# Patient Record
Sex: FEMALE | Race: WHITE | Employment: FULL TIME | URBAN - METROPOLITAN AREA
[De-identification: names, ages, dates, MRNs, and addresses within clinical notes are randomized per-mention and may not be internally consistent; named-entity substitution may affect disease eponyms.]

---

## 2021-04-09 LAB
CHLAMYDIA, EXTERNAL: NEGATIVE
HBSAG, EXTERNAL: NEGATIVE
HIV, EXTERNAL: NEGATIVE
N. GONORRHEA, EXTERNAL: NEGATIVE
RUBELLA, EXTERNAL: NORMAL
T. PALLIDUM, EXTERNAL: NEGATIVE
TYPE, ABO & RH, EXTERNAL: NORMAL

## 2021-12-02 LAB — GRBS, EXTERNAL: NEGATIVE

## 2021-12-28 ENCOUNTER — HOSPITAL ENCOUNTER (INPATIENT)
Age: 26
LOS: 3 days | Discharge: HOME | End: 2021-12-31
Attending: OBSTETRICS & GYNECOLOGY | Admitting: OBSTETRICS & GYNECOLOGY
Payer: COMMERCIAL

## 2021-12-28 ENCOUNTER — ANESTHESIA (OUTPATIENT)
Dept: LABOR AND DELIVERY | Age: 26
End: 2021-12-28
Payer: COMMERCIAL

## 2021-12-28 ENCOUNTER — ANESTHESIA EVENT (OUTPATIENT)
Dept: LABOR AND DELIVERY | Age: 26
End: 2021-12-28
Payer: COMMERCIAL

## 2021-12-28 DIAGNOSIS — Z98.891 STATUS POST CESAREAN DELIVERY: Primary | ICD-10-CM

## 2021-12-28 PROBLEM — O42.90 AMNIOTIC FLUID LEAKING: Status: ACTIVE | Noted: 2021-12-28

## 2021-12-28 LAB
COVID-19 RAPID TEST, COVR: NOT DETECTED
ERYTHROCYTE [DISTWIDTH] IN BLOOD BY AUTOMATED COUNT: 13.1 % (ref 11.5–14.5)
HCT VFR BLD AUTO: 41.3 % (ref 35–47)
HGB BLD-MCNC: 14 G/DL (ref 11.5–16)
MCH RBC QN AUTO: 32.3 PG (ref 26–34)
MCHC RBC AUTO-ENTMCNC: 33.9 G/DL (ref 30–36.5)
MCV RBC AUTO: 95.4 FL (ref 80–99)
NRBC # BLD: 0 K/UL (ref 0–0.01)
NRBC BLD-RTO: 0 PER 100 WBC
PLATELET # BLD AUTO: 192 K/UL (ref 150–400)
PMV BLD AUTO: 11.4 FL (ref 8.9–12.9)
RBC # BLD AUTO: 4.33 M/UL (ref 3.8–5.2)
SARS-COV-2, COV2: NORMAL
SOURCE, COVRS: NORMAL
WBC # BLD AUTO: 9.7 K/UL (ref 3.6–11)

## 2021-12-28 PROCEDURE — 74011000250 HC RX REV CODE- 250: Performed by: ANESTHESIOLOGY

## 2021-12-28 PROCEDURE — 00HU33Z INSERTION OF INFUSION DEVICE INTO SPINAL CANAL, PERCUTANEOUS APPROACH: ICD-10-PCS | Performed by: ANESTHESIOLOGY

## 2021-12-28 PROCEDURE — 85027 COMPLETE CBC AUTOMATED: CPT

## 2021-12-28 PROCEDURE — 74011000258 HC RX REV CODE- 258: Performed by: MIDWIFE

## 2021-12-28 PROCEDURE — 74011250636 HC RX REV CODE- 250/636: Performed by: MIDWIFE

## 2021-12-28 PROCEDURE — 36415 COLL VENOUS BLD VENIPUNCTURE: CPT

## 2021-12-28 PROCEDURE — 3E0R3BZ INTRODUCTION OF ANESTHETIC AGENT INTO SPINAL CANAL, PERCUTANEOUS APPROACH: ICD-10-PCS | Performed by: ANESTHESIOLOGY

## 2021-12-28 PROCEDURE — 99285 EMERGENCY DEPT VISIT HI MDM: CPT

## 2021-12-28 PROCEDURE — 65270000029 HC RM PRIVATE

## 2021-12-28 PROCEDURE — 74011250636 HC RX REV CODE- 250/636: Performed by: STUDENT IN AN ORGANIZED HEALTH CARE EDUCATION/TRAINING PROGRAM

## 2021-12-28 PROCEDURE — 87635 SARS-COV-2 COVID-19 AMP PRB: CPT

## 2021-12-28 RX ORDER — OXYTOCIN/RINGER'S LACTATE 30/500 ML
1-25 PLASTIC BAG, INJECTION (ML) INTRAVENOUS
Status: DISCONTINUED | OUTPATIENT
Start: 2021-12-28 | End: 2021-12-31 | Stop reason: HOSPADM

## 2021-12-28 RX ORDER — TERBUTALINE SULFATE 1 MG/ML
0.25 INJECTION SUBCUTANEOUS AS NEEDED
Status: DISCONTINUED | OUTPATIENT
Start: 2021-12-28 | End: 2021-12-29 | Stop reason: HOSPADM

## 2021-12-28 RX ORDER — ONDANSETRON 2 MG/ML
4 INJECTION INTRAMUSCULAR; INTRAVENOUS
Status: DISCONTINUED | OUTPATIENT
Start: 2021-12-28 | End: 2021-12-29 | Stop reason: HOSPADM

## 2021-12-28 RX ORDER — BUTORPHANOL TARTRATE 1 MG/ML
2 INJECTION INTRAMUSCULAR; INTRAVENOUS
Status: DISCONTINUED | OUTPATIENT
Start: 2021-12-28 | End: 2021-12-29 | Stop reason: HOSPADM

## 2021-12-28 RX ORDER — BUTORPHANOL TARTRATE 1 MG/ML
1 INJECTION INTRAMUSCULAR; INTRAVENOUS
Status: DISCONTINUED | OUTPATIENT
Start: 2021-12-28 | End: 2021-12-28

## 2021-12-28 RX ORDER — FENTANYL CITRATE 50 UG/ML
100 INJECTION, SOLUTION INTRAMUSCULAR; INTRAVENOUS
Status: DISCONTINUED | OUTPATIENT
Start: 2021-12-28 | End: 2021-12-29 | Stop reason: HOSPADM

## 2021-12-28 RX ORDER — OXYTOCIN/RINGER'S LACTATE 30/500 ML
10 PLASTIC BAG, INJECTION (ML) INTRAVENOUS AS NEEDED
Status: DISCONTINUED | OUTPATIENT
Start: 2021-12-28 | End: 2021-12-31 | Stop reason: HOSPADM

## 2021-12-28 RX ORDER — OXYTOCIN/RINGER'S LACTATE 30/500 ML
87.3 PLASTIC BAG, INJECTION (ML) INTRAVENOUS AS NEEDED
Status: COMPLETED | OUTPATIENT
Start: 2021-12-28 | End: 2021-12-29

## 2021-12-28 RX ORDER — BUPIVACAINE HYDROCHLORIDE 5 MG/ML
INJECTION, SOLUTION EPIDURAL; INTRACAUDAL
Status: COMPLETED | OUTPATIENT
Start: 2021-12-28 | End: 2021-12-28

## 2021-12-28 RX ORDER — EPHEDRINE SULFATE/0.9% NACL/PF 50 MG/5 ML
12.5 SYRINGE (ML) INTRAVENOUS
Status: COMPLETED | OUTPATIENT
Start: 2021-12-28 | End: 2021-12-29

## 2021-12-28 RX ORDER — NALOXONE HYDROCHLORIDE 0.4 MG/ML
0.4 INJECTION, SOLUTION INTRAMUSCULAR; INTRAVENOUS; SUBCUTANEOUS AS NEEDED
Status: DISCONTINUED | OUTPATIENT
Start: 2021-12-28 | End: 2021-12-29 | Stop reason: HOSPADM

## 2021-12-28 RX ORDER — SODIUM CHLORIDE, SODIUM LACTATE, POTASSIUM CHLORIDE, CALCIUM CHLORIDE 600; 310; 30; 20 MG/100ML; MG/100ML; MG/100ML; MG/100ML
125 INJECTION, SOLUTION INTRAVENOUS CONTINUOUS
Status: DISCONTINUED | OUTPATIENT
Start: 2021-12-28 | End: 2021-12-31 | Stop reason: HOSPADM

## 2021-12-28 RX ORDER — BUPIVACAINE HYDROCHLORIDE 5 MG/ML
30 INJECTION, SOLUTION EPIDURAL; INTRACAUDAL AS NEEDED
Status: DISCONTINUED | OUTPATIENT
Start: 2021-12-28 | End: 2021-12-29 | Stop reason: HOSPADM

## 2021-12-28 RX ORDER — FENTANYL/BUPIVACAINE/NS/PF 2-1250MCG
1-16 PREFILLED PUMP RESERVOIR EPIDURAL CONTINUOUS
Status: DISCONTINUED | OUTPATIENT
Start: 2021-12-28 | End: 2021-12-29 | Stop reason: HOSPADM

## 2021-12-28 RX ADMIN — PROMETHAZINE HYDROCHLORIDE 12.5 MG: 25 INJECTION INTRAMUSCULAR; INTRAVENOUS at 20:58

## 2021-12-28 RX ADMIN — SODIUM CHLORIDE, POTASSIUM CHLORIDE, SODIUM LACTATE AND CALCIUM CHLORIDE 125 ML/HR: 600; 310; 30; 20 INJECTION, SOLUTION INTRAVENOUS at 13:00

## 2021-12-28 RX ADMIN — SODIUM CHLORIDE, POTASSIUM CHLORIDE, SODIUM LACTATE AND CALCIUM CHLORIDE 125 ML/HR: 600; 310; 30; 20 INJECTION, SOLUTION INTRAVENOUS at 21:02

## 2021-12-28 RX ADMIN — Medication 2 MILLI-UNITS/MIN: at 13:00

## 2021-12-28 RX ADMIN — SODIUM CHLORIDE, POTASSIUM CHLORIDE, SODIUM LACTATE AND CALCIUM CHLORIDE 125 ML/HR: 600; 310; 30; 20 INJECTION, SOLUTION INTRAVENOUS at 23:10

## 2021-12-28 RX ADMIN — Medication 10 ML/HR: at 23:18

## 2021-12-28 RX ADMIN — BUPIVACAINE HYDROCHLORIDE 30 MG: 5 INJECTION, SOLUTION EPIDURAL; INTRACAUDAL; PERINEURAL at 22:54

## 2021-12-28 RX ADMIN — BUTORPHANOL TARTRATE 2 MG: 1 INJECTION, SOLUTION INTRAMUSCULAR; INTRAVENOUS at 20:58

## 2021-12-28 NOTE — H&P
Patient admitted from [facility] with ROM. Intermittent monitoring/ambulation. Pt is a very pleasant 26 yo G1 @ 40 5/7 wks who presents to [facility] with report of ROM since MN. She has clear fluid with continued leaking. She has had mild contractions that are now occurring about every 5 minutes. She has had some pink tinged fluid. She has active FM and no perla VB. Pt reports an uncomplicated pregnancy, supervised by Dr. Leah Leary.       Patient is s/p COVID vaccine and booster. She denies any current symptoms or known sick contacts. GBS is negative.             Chief Complaint   Patient presents with    Abdominal Pain    Rupture of Membranes         Past Medical History   No past medical history on file.        Past Surgical History   No past surgical history on file. No family history on file.     Social History            Socioeconomic History    Marital status: Not on file       Spouse name: Not on file    Number of children: Not on file    Years of education: Not on file    Highest education level: Not on file   Occupational History    Not on file   Tobacco Use    Smoking status: Not on file    Smokeless tobacco: Not on file   Substance and Sexual Activity    Alcohol use: Not on file    Drug use: Not on file    Sexual activity: Not on file   Other Topics Concern    Not on file   Social History Narrative    Not on file      Social Determinants of Health          Financial Resource Strain:     Difficulty of Paying Living Expenses: Not on file   Food Insecurity:     Worried About Running Out of Food in the Last Year: Not on file    Jorge of Food in the Last Year: Not on file   Transportation Needs:     Lack of Transportation (Medical): Not on file    Lack of Transportation (Non-Medical):  Not on file   Physical Activity:     Days of Exercise per Week: Not on file    Minutes of Exercise per Session: Not on file   Stress:     Feeling of Stress : Not on file   Social Connections:  Frequency of Communication with Friends and Family: Not on file    Frequency of Social Gatherings with Friends and Family: Not on file    Attends Yazidism Services: Not on file    Active Member of Clubs or Organizations: Not on file    Attends Club or Organization Meetings: Not on file    Marital Status: Not on file   Intimate Partner Violence:     Fear of Current or Ex-Partner: Not on file    Emotionally Abused: Not on file    Physically Abused: Not on file    Sexually Abused: Not on file   Housing Stability:     Unable to Pay for Housing in the Last Year: Not on file    Number of [place] in the Last Year: Not on file    Unstable Housing in the Last Year: Not on file            ALLERGIES: Cashew nut     Review of Systems   Constitutional: Negative. HENT: Negative. Eyes: Negative. Respiratory: Negative. Cardiovascular: Negative. Gastrointestinal: Positive for abdominal pain. Endocrine: Negative. Genitourinary: Positive for vaginal discharge. Musculoskeletal: Negative. Skin: Negative. Allergic/Immunologic: Negative. Neurological: Negative. Hematological: Negative. Psychiatric/Behavioral: Negative.          There were no vitals filed for this visit.          Physical Exam  Vitals and nursing note reviewed. Exam conducted with a chaperone present. Constitutional:       Appearance: She is well-developed and normal weight. HENT:      Head: Normocephalic. Eyes:      Extraocular Movements: Extraocular movements intact. Cardiovascular:      Rate and Rhythm: Normal rate. Pulmonary:      Effort: Pulmonary effort is normal.   Abdominal:      Comments: Soft, gravid, nontender   Genitourinary:     Comments: Grossly ruptured/nitrazine positive  Cervix 1/75/-3/VTX  Skin:     General: Skin is warm and dry. Neurological:      General: No focal deficit present. Mental Status: She is alert and oriented to person, place, and time.    Psychiatric:         Mood and Affect: Mood normal.            MDM  Number of Diagnoses or Management Options     Amount and/or Complexity of Data Reviewed  Independent visualization of images, tracings, or specimens: (An NST was performed and was reactive. The baseline FHR was 140. Moderate baseline  variability was noted. Accelerations of sufficient amplitude and duration were noted. There were no decelerations noted.   TOCO q 5 minutes)                      ED Course as of 12/28/21 0642   Tue Dec 28, 2021   0624 Pt admitted to [facility] for eval of ROM  Nitrazine pos; grossly ruptured  Cx 1/75/-3/VTX [DG]   0636 Admit to L&D  GBS neg  Pt desires low intervention and would like to walk  Suggest moving forward with augmentation by at least noon if no spontaneous labor [DG]       ED Course User Index  [DG] Melissa Leach MD

## 2021-12-28 NOTE — PROGRESS NOTES
Labor Progress Note  Cipriano Lino is a 27 y.o.  with IUP at 40w5d, admitted following SROM overnight. Her pregnancy has been managed by Dr. Lord Lin and has been uncomplicated. Patient seen, fetal heart rate and contraction pattern evaluated, patient examined. Contractions have increased slightly in intensity, and she is now feeling them every 5 minutes or so. No data found. Physical Exam:  Cervical Exam:  /-3  Uterine Activity: Irritability  Fetal Heart Rate: baseline 130, moderate variability, -accelerations, -decelerations. Cat 1.     Assessment/Plan:  Will start pitocin, since she hasn't made any cervical change over the past 5+ hours  Will need CEFM while on pit  Titrate as tolerated  Patient agreeable to plan    Sandy Mary MD

## 2021-12-28 NOTE — PROGRESS NOTES
Bedside, Verbal and Written shift change report given to JOSUÉ Chen (oncoming nurse) by ALICJA Nieto RN(offgoing nurse). Report included the following information SBAR, Kardex, Intake/Output, MAR and Recent Results.

## 2021-12-28 NOTE — ED PROVIDER NOTES
Pt is a very pleasant 26 yo G1 @ 40 5/7 wks who presents to [facility] with report of ROM since MN. She has clear fluid with continued leaking. She has had mild contractions that are now occurring about every 5 minutes. She has had some pink tinged fluid. She has active FM and no perla VB. Pt reports an uncomplicated pregnancy, supervised by Dr. Armani Fragoso. Patient is s/p COVID vaccine and booster. She denies any current symptoms or known sick contacts. GBS is negative. Chief Complaint   Patient presents with    Abdominal Pain    Rupture of Membranes       No past medical history on file. No past surgical history on file. No family history on file. Social History     Socioeconomic History    Marital status: Not on file     Spouse name: Not on file    Number of children: Not on file    Years of education: Not on file    Highest education level: Not on file   Occupational History    Not on file   Tobacco Use    Smoking status: Not on file    Smokeless tobacco: Not on file   Substance and Sexual Activity    Alcohol use: Not on file    Drug use: Not on file    Sexual activity: Not on file   Other Topics Concern    Not on file   Social History Narrative    Not on file     Social Determinants of Health     Financial Resource Strain:     Difficulty of Paying Living Expenses: Not on file   Food Insecurity:     Worried About Running Out of Food in the Last Year: Not on file    Jorge of Food in the Last Year: Not on file   Transportation Needs:     Lack of Transportation (Medical): Not on file    Lack of Transportation (Non-Medical):  Not on file   Physical Activity:     Days of Exercise per Week: Not on file    Minutes of Exercise per Session: Not on file   Stress:     Feeling of Stress : Not on file   Social Connections:     Frequency of Communication with Friends and Family: Not on file    Frequency of Social Gatherings with Friends and Family: Not on file    Attends Tenriism Services: Not on file    Active Member of Clubs or Organizations: Not on file    Attends Club or Organization Meetings: Not on file    Marital Status: Not on file   Intimate Partner Violence:     Fear of Current or Ex-Partner: Not on file    Emotionally Abused: Not on file    Physically Abused: Not on file    Sexually Abused: Not on file   Housing Stability:     Unable to Pay for Housing in the Last Year: Not on file    Number of [place] in the Last Year: Not on file    Unstable Housing in the Last Year: Not on file         ALLERGIES: Cashew nut    Review of Systems   Constitutional: Negative. HENT: Negative. Eyes: Negative. Respiratory: Negative. Cardiovascular: Negative. Gastrointestinal: Positive for abdominal pain. Endocrine: Negative. Genitourinary: Positive for vaginal discharge. Musculoskeletal: Negative. Skin: Negative. Allergic/Immunologic: Negative. Neurological: Negative. Hematological: Negative. Psychiatric/Behavioral: Negative. There were no vitals filed for this visit. Physical Exam  Vitals and nursing note reviewed. Exam conducted with a chaperone present. Constitutional:       Appearance: She is well-developed and normal weight. HENT:      Head: Normocephalic. Eyes:      Extraocular Movements: Extraocular movements intact. Cardiovascular:      Rate and Rhythm: Normal rate. Pulmonary:      Effort: Pulmonary effort is normal.   Abdominal:      Comments: Soft, gravid, nontender   Genitourinary:     Comments: Grossly ruptured/nitrazine positive  Cervix 1/75/-3/VTX  Skin:     General: Skin is warm and dry. Neurological:      General: No focal deficit present. Mental Status: She is alert and oriented to person, place, and time.    Psychiatric:         Mood and Affect: Mood normal.          MDM  Number of Diagnoses or Management Options     Amount and/or Complexity of Data Reviewed  Independent visualization of images, tracings, or specimens: (An NST was performed and was reactive. The baseline FHR was 140. Moderate baseline  variability was noted. Accelerations of sufficient amplitude and duration were noted. There were no decelerations noted.   TOCO q 5 minutes)              ED Course as of 12/28/21 0642   Tue Dec 28, 2021   0624 Pt admitted to [facility] for eval of ROM  Nitrazine pos; grossly ruptured  Cx 1/75/-3/VTX [DG]   0636 Admit to L&D  GBS neg  Pt desires low intervention and would like to walk  Suggest moving forward with augmentation by at least noon if no spontaneous labor [DG]      ED Course User Index  [DG] Rafat Ng MD       Procedures    [unfilled]

## 2021-12-28 NOTE — PROGRESS NOTES
Labor Progress Note  Cipriano Lino is a 27 y.o.  with IUP at 40w5d, admitted following SROM overnight. Her pregnancy has been managed by Dr. Lord Lin and has been uncomplicated. Patient seen, fetal heart rate and contraction pattern evaluated, patient examined. She desires low interventions. Has been feeling contractions every 10 minutes for the past 8 hours. Patient Vitals for the past 2 hrs:   Height Weight   21 0700 5' 6\" (1.676 m) 68 kg (150 lb)       Physical Exam:  Cervical Exam:  Deferred, /-3 per Dr. Rupesh Delarosa check on arrival  Uterine Activity: Irritability  Fetal Heart Rate: baseline 130, moderate variability, -accelerations, -decelerations. Cat 1. Assessment/Plan:  Reassuring fetal status, Labor  Continue expectant management, Continue plan for vaginal delivery. Will recheck around noon and discussed starting pitocin at that time if no cervical change has been made. She wants to try to labor without an epidural but is open to it if she feels like she needs it.     Sandy Mary MD

## 2021-12-28 NOTE — PROGRESS NOTES
1815 Bedside and Verbal shift change report received from Juvencio Mccray RNC. Report included the following information SBAR, MAR and Recent Results. Patient in knee chest / hands and knees  1845 To left lateral   1900 Up to birthing ball  [address] Verbal shift change report given to JOSE A Hawley RN  by [facility]. Report included the following information SBAR, MAR and Recent Results.

## 2021-12-28 NOTE — PROGRESS NOTES
7301 Dr Lamar Aponte at bedside to view strip and talk to patient. Nitrazine done, and SVE done. Nitrazine positive, cervix is 1cm. Patient desires low intervention and will be admitted to L&D room 5.

## 2021-12-28 NOTE — PROGRESS NOTES
0630 Pt walked from [facility] to LDR 5 for admission and early labormanagement. Pt desires low intervention birth. Oriented to room and will monitor at 0730  0734 Monitor placed and consents signed  0740 PIV and labs  0754 mintor off and Dr. Rock at bedside discussing plan of care to intermittant monitor and reassess at lunchtime  1030 Side lying pelvic release on right and left for 3 UCs each. Pt assisted into knee chest for 3 more UCs  1300 SVE Dr. Rock shows minimal cervical change.  Plan of care discussed and pitocin init

## 2021-12-29 PROCEDURE — 74011250636 HC RX REV CODE- 250/636: Performed by: OBSTETRICS & GYNECOLOGY

## 2021-12-29 PROCEDURE — 74011250636 HC RX REV CODE- 250/636: Performed by: MIDWIFE

## 2021-12-29 PROCEDURE — 74011000250 HC RX REV CODE- 250: Performed by: ANESTHESIOLOGY

## 2021-12-29 PROCEDURE — 76010000391 HC C SECN FIRST 1 HR: Performed by: OBSTETRICS & GYNECOLOGY

## 2021-12-29 PROCEDURE — 74011000250 HC RX REV CODE- 250: Performed by: MIDWIFE

## 2021-12-29 PROCEDURE — 74011250636 HC RX REV CODE- 250/636: Performed by: ANESTHESIOLOGY

## 2021-12-29 PROCEDURE — 65410000002 HC RM PRIVATE OB

## 2021-12-29 PROCEDURE — 74011250636 HC RX REV CODE- 250/636: Performed by: NURSE PRACTITIONER

## 2021-12-29 PROCEDURE — 74011250636 HC RX REV CODE- 250/636: Performed by: STUDENT IN AN ORGANIZED HEALTH CARE EDUCATION/TRAINING PROGRAM

## 2021-12-29 PROCEDURE — 76060000078 HC EPIDURAL ANESTHESIA: Performed by: OBSTETRICS & GYNECOLOGY

## 2021-12-29 PROCEDURE — 74011000250 HC RX REV CODE- 250: Performed by: NURSE PRACTITIONER

## 2021-12-29 PROCEDURE — 76010000392 HC C SECN EA ADDL 0.5 HR: Performed by: OBSTETRICS & GYNECOLOGY

## 2021-12-29 PROCEDURE — 75410000003 HC RECOV DEL/VAG/CSECN EA 0.5 HR: Performed by: OBSTETRICS & GYNECOLOGY

## 2021-12-29 RX ORDER — EPHEDRINE SULFATE/0.9% NACL/PF 50 MG/5 ML
SYRINGE (ML) INTRAVENOUS AS NEEDED
Status: DISCONTINUED | OUTPATIENT
Start: 2021-12-29 | End: 2021-12-29 | Stop reason: HOSPADM

## 2021-12-29 RX ORDER — SODIUM CHLORIDE, SODIUM LACTATE, POTASSIUM CHLORIDE, CALCIUM CHLORIDE 600; 310; 30; 20 MG/100ML; MG/100ML; MG/100ML; MG/100ML
125 INJECTION, SOLUTION INTRAVENOUS CONTINUOUS
Status: DISCONTINUED | OUTPATIENT
Start: 2021-12-29 | End: 2021-12-31 | Stop reason: HOSPADM

## 2021-12-29 RX ORDER — MORPHINE SULFATE 0.5 MG/ML
INJECTION, SOLUTION EPIDURAL; INTRATHECAL; INTRAVENOUS AS NEEDED
Status: DISCONTINUED | OUTPATIENT
Start: 2021-12-29 | End: 2021-12-29 | Stop reason: HOSPADM

## 2021-12-29 RX ORDER — DEXMEDETOMIDINE HYDROCHLORIDE 100 UG/ML
INJECTION, SOLUTION INTRAVENOUS AS NEEDED
Status: DISCONTINUED | OUTPATIENT
Start: 2021-12-29 | End: 2021-12-29 | Stop reason: HOSPADM

## 2021-12-29 RX ORDER — MORPHINE SULFATE 4 MG/ML
INJECTION INTRAVENOUS AS NEEDED
Status: DISCONTINUED | OUTPATIENT
Start: 2021-12-29 | End: 2021-12-29

## 2021-12-29 RX ORDER — OXYTOCIN/RINGER'S LACTATE 30/500 ML
10 PLASTIC BAG, INJECTION (ML) INTRAVENOUS AS NEEDED
Status: DISCONTINUED | OUTPATIENT
Start: 2021-12-29 | End: 2021-12-31 | Stop reason: HOSPADM

## 2021-12-29 RX ORDER — KETOROLAC TROMETHAMINE 30 MG/ML
30 INJECTION, SOLUTION INTRAMUSCULAR; INTRAVENOUS
Status: DISCONTINUED | OUTPATIENT
Start: 2021-12-29 | End: 2021-12-31 | Stop reason: HOSPADM

## 2021-12-29 RX ORDER — ONDANSETRON 2 MG/ML
4 INJECTION INTRAMUSCULAR; INTRAVENOUS
Status: DISCONTINUED | OUTPATIENT
Start: 2021-12-29 | End: 2021-12-31 | Stop reason: HOSPADM

## 2021-12-29 RX ORDER — DOCUSATE SODIUM 100 MG/1
100 CAPSULE, LIQUID FILLED ORAL
Status: DISCONTINUED | OUTPATIENT
Start: 2021-12-29 | End: 2021-12-31 | Stop reason: HOSPADM

## 2021-12-29 RX ORDER — ONDANSETRON 2 MG/ML
INJECTION INTRAMUSCULAR; INTRAVENOUS AS NEEDED
Status: DISCONTINUED | OUTPATIENT
Start: 2021-12-29 | End: 2021-12-29 | Stop reason: HOSPADM

## 2021-12-29 RX ORDER — LIDOCAINE HYDROCHLORIDE AND EPINEPHRINE 20; 5 MG/ML; UG/ML
INJECTION, SOLUTION EPIDURAL; INFILTRATION; INTRACAUDAL; PERINEURAL AS NEEDED
Status: DISCONTINUED | OUTPATIENT
Start: 2021-12-29 | End: 2021-12-29 | Stop reason: HOSPADM

## 2021-12-29 RX ORDER — HYDROCORTISONE ACETATE PRAMOXINE HCL 2.5; 1 G/100G; G/100G
CREAM TOPICAL AS NEEDED
Status: DISCONTINUED | OUTPATIENT
Start: 2021-12-29 | End: 2021-12-31 | Stop reason: HOSPADM

## 2021-12-29 RX ORDER — DIPHENHYDRAMINE HCL 25 MG
25 CAPSULE ORAL
Status: DISCONTINUED | OUTPATIENT
Start: 2021-12-29 | End: 2021-12-31 | Stop reason: HOSPADM

## 2021-12-29 RX ORDER — KETOROLAC TROMETHAMINE 30 MG/ML
INJECTION, SOLUTION INTRAMUSCULAR; INTRAVENOUS AS NEEDED
Status: DISCONTINUED | OUTPATIENT
Start: 2021-12-29 | End: 2021-12-29 | Stop reason: HOSPADM

## 2021-12-29 RX ORDER — OXYCODONE AND ACETAMINOPHEN 5; 325 MG/1; MG/1
1 TABLET ORAL
Status: DISCONTINUED | OUTPATIENT
Start: 2021-12-29 | End: 2021-12-31 | Stop reason: HOSPADM

## 2021-12-29 RX ORDER — MORPHINE SULFATE 10 MG/ML
10 INJECTION, SOLUTION INTRAMUSCULAR; INTRAVENOUS
Status: ACTIVE | OUTPATIENT
Start: 2021-12-29 | End: 2021-12-30

## 2021-12-29 RX ORDER — AMMONIA 15 % (W/V)
1 AMPUL (EA) INHALATION AS NEEDED
Status: DISCONTINUED | OUTPATIENT
Start: 2021-12-29 | End: 2021-12-31 | Stop reason: HOSPADM

## 2021-12-29 RX ORDER — SIMETHICONE 80 MG
80 TABLET,CHEWABLE ORAL
Status: DISCONTINUED | OUTPATIENT
Start: 2021-12-29 | End: 2021-12-31 | Stop reason: HOSPADM

## 2021-12-29 RX ORDER — GLYCOPYRROLATE 0.2 MG/ML
INJECTION INTRAMUSCULAR; INTRAVENOUS AS NEEDED
Status: DISCONTINUED | OUTPATIENT
Start: 2021-12-29 | End: 2021-12-29 | Stop reason: HOSPADM

## 2021-12-29 RX ORDER — OXYTOCIN/RINGER'S LACTATE 30/500 ML
87.3 PLASTIC BAG, INJECTION (ML) INTRAVENOUS AS NEEDED
Status: DISCONTINUED | OUTPATIENT
Start: 2021-12-29 | End: 2021-12-31 | Stop reason: HOSPADM

## 2021-12-29 RX ORDER — PROPOFOL 10 MG/ML
INJECTION, EMULSION INTRAVENOUS AS NEEDED
Status: DISCONTINUED | OUTPATIENT
Start: 2021-12-29 | End: 2021-12-29 | Stop reason: HOSPADM

## 2021-12-29 RX ORDER — MORPHINE SULFATE 10 MG/ML
6 INJECTION, SOLUTION INTRAMUSCULAR; INTRAVENOUS
Status: ACTIVE | OUTPATIENT
Start: 2021-12-29 | End: 2021-12-30

## 2021-12-29 RX ORDER — SODIUM CHLORIDE 0.9 % (FLUSH) 0.9 %
5-40 SYRINGE (ML) INJECTION EVERY 8 HOURS
Status: DISCONTINUED | OUTPATIENT
Start: 2021-12-29 | End: 2021-12-31 | Stop reason: HOSPADM

## 2021-12-29 RX ORDER — HYDROCORTISONE 1 %
CREAM (GRAM) TOPICAL AS NEEDED
Status: DISCONTINUED | OUTPATIENT
Start: 2021-12-29 | End: 2021-12-31 | Stop reason: HOSPADM

## 2021-12-29 RX ORDER — ACETAMINOPHEN 325 MG/1
650 TABLET ORAL
Status: DISCONTINUED | OUTPATIENT
Start: 2021-12-29 | End: 2021-12-31 | Stop reason: HOSPADM

## 2021-12-29 RX ORDER — SODIUM CHLORIDE 0.9 % (FLUSH) 0.9 %
5-40 SYRINGE (ML) INJECTION AS NEEDED
Status: DISCONTINUED | OUTPATIENT
Start: 2021-12-29 | End: 2021-12-31 | Stop reason: HOSPADM

## 2021-12-29 RX ORDER — OXYCODONE AND ACETAMINOPHEN 5; 325 MG/1; MG/1
2 TABLET ORAL
Status: DISCONTINUED | OUTPATIENT
Start: 2021-12-29 | End: 2021-12-31 | Stop reason: HOSPADM

## 2021-12-29 RX ORDER — IBUPROFEN 400 MG/1
800 TABLET ORAL EVERY 8 HOURS
Status: DISCONTINUED | OUTPATIENT
Start: 2021-12-29 | End: 2021-12-31 | Stop reason: HOSPADM

## 2021-12-29 RX ADMIN — DEXMEDETOMIDINE HYDROCHLORIDE 2.5 MCG: 100 INJECTION, SOLUTION, CONCENTRATE INTRAVENOUS at 07:08

## 2021-12-29 RX ADMIN — Medication 10 MG: at 07:42

## 2021-12-29 RX ADMIN — LIDOCAINE HYDROCHLORIDE AND EPINEPHRINE 12 ML: 20; 5 INJECTION, SOLUTION EPIDURAL; INFILTRATION; INTRACAUDAL; PERINEURAL at 06:31

## 2021-12-29 RX ADMIN — ONDANSETRON HYDROCHLORIDE 4 MG: 2 INJECTION, SOLUTION INTRAMUSCULAR; INTRAVENOUS at 07:19

## 2021-12-29 RX ADMIN — Medication 4 MILLI-UNITS/MIN: at 02:52

## 2021-12-29 RX ADMIN — GLYCOPYRROLATE 0.2 MG: 0.2 INJECTION, SOLUTION INTRAMUSCULAR; INTRAVENOUS at 07:19

## 2021-12-29 RX ADMIN — PROPOFOL 20 MG: 10 INJECTION, EMULSION INTRAVENOUS at 07:50

## 2021-12-29 RX ADMIN — Medication 909 ML/HR: at 07:27

## 2021-12-29 RX ADMIN — AZITHROMYCIN MONOHYDRATE 500 MG: 500 INJECTION, POWDER, LYOPHILIZED, FOR SOLUTION INTRAVENOUS at 07:15

## 2021-12-29 RX ADMIN — PROPOFOL 50 MG: 10 INJECTION, EMULSION INTRAVENOUS at 07:42

## 2021-12-29 RX ADMIN — TERBUTALINE SULFATE 0.25 MG: 1 INJECTION SUBCUTANEOUS at 01:23

## 2021-12-29 RX ADMIN — KETOROLAC TROMETHAMINE 30 MG: 30 INJECTION, SOLUTION INTRAMUSCULAR; INTRAVENOUS at 18:55

## 2021-12-29 RX ADMIN — CEFAZOLIN SODIUM 2 G: 1 INJECTION, POWDER, FOR SOLUTION INTRAMUSCULAR; INTRAVENOUS at 06:58

## 2021-12-29 RX ADMIN — Medication 3 MG: at 07:47

## 2021-12-29 RX ADMIN — KETOROLAC TROMETHAMINE 30 MG: 30 INJECTION, SOLUTION INTRAMUSCULAR; INTRAVENOUS at 13:34

## 2021-12-29 RX ADMIN — SODIUM CHLORIDE 40 MCG/MIN: 9 INJECTION, SOLUTION INTRAVENOUS at 07:32

## 2021-12-29 RX ADMIN — Medication 12.5 MG: at 02:12

## 2021-12-29 RX ADMIN — PROPOFOL 20 MG: 10 INJECTION, EMULSION INTRAVENOUS at 07:28

## 2021-12-29 RX ADMIN — SODIUM CHLORIDE 80 MCG: 9 INJECTION, SOLUTION INTRAVENOUS at 07:42

## 2021-12-29 RX ADMIN — Medication 10 MG: at 07:52

## 2021-12-29 RX ADMIN — KETOROLAC TROMETHAMINE 30 MG: 30 INJECTION, SOLUTION INTRAMUSCULAR; INTRAVENOUS at 07:59

## 2021-12-29 RX ADMIN — Medication 2 MG: at 08:03

## 2021-12-29 NOTE — PROCEDURES
Section Delivery Operative Report     Date of Surgery: 2021     Preoperative Diagnosis: Arrest of Descent, 40 weeks    Postoperative Diagnosis: Arrest of Descent, delivered with viable male infant    Procedure: Procedure(s):  Primary low transverse  SECTION  Via pfannenstiel skin incision    Surgeon(s) and Role:     Nolvia Rodriguez MD - Primary   Assist-Linda    Anesthesia: Epidural    Findings: prominent anterior sacrum. Viable male infant in LOT-OP position with a deflexed neck position. Normal uterus, tubes and ovaries. Inferior left uterine extension noted after delivery of baby. Normal appearing placenta. Procedure Detail:    The patient was taken to the operating room, where epidural anesthesia was found to be adequate. The patient was prepped and draped in the normal sterile fashion vaginally and abdominally. She had a bey catheter in place and SCDs on. She received ancef and zithromax. Pfannenstiel skin incision was made with the scalpel and carried down to the underlying fascia. The fascial incision was extended laterally with Hemphill scissors. This fascial incision was grasped with Kocher clamps, tented up, and the underlying rectus muscles were dissected bluntly. The inferior edge of the rectus fascia was grasped with Kocher clamps, tented up, and the underlying rectus muscle was dissected off bluntly. The rectus muscle was divided in the midline bluntly. The peritoneum was entered bluntly with hemostat and extended inferiorly and superiorly with Metzenbaum scissors. The bladder blade was then inserted. The vesicouterine and peritoneum was identified, grasped with pick-ups and entered sharply with Metzenbaum scissors. The bladder flap was then created digitally and the bladder blade was reinserted. A low transverse uterine incision was made with the scalpel and extended laterally with blunt finger dissection. The babys head was then delivered atraumatically.   The delivery was complicated by low position in the pelvis and deflexed neck position. Asked OR nurse [employer]iel[employer] to assist vaginally and meanwhile I switched positions to the left side of the OR table for a better angle and was able to then facilitate an atraumatic delivery. The nose and mouth were suctioned. The cord was clamped and cut and the baby was handed off to the waiting  care unit staff - the baby was vigorous and delayed cord clamping as performed of baby ramos Sanabria. Placenta was then delivered spontaneously. The uterus was exteriorized and cleared of all clots and debris. The uterine incision was closed in two layers. The first layer with running locked layer of 0 monocryl. Prior to doing this an 0 monocryl was used to secure the left inferior extension with an interrupted suture. . The second layer was an imbricating layer of 0 monocryl with good hemostasis assured. An allis was then used to secure the most inferior angle of the extension since rest of bleeding had been contained and visualization improved. A running locked suture was run to close the extension for excellent hemostais. The bladder was inferior to where the extension was and inspection posteriorly showed no extension in to the broad ligament or uterine vessel. The pelvis was washed with warm normal saline. Good hemostasis was again reassured. The bladder flap was was reincorporated in to the edge of the imbricating uterine layer. The paracolic gutters were washed with warm normal saline and the uterus was returned to the abdomen. Good hemostasis was again reassured throughout. Surgicel gel pad was placed along both angles of the hysterotomy for additional hemostasis. The fascia was closed with 0 Vicryl in a running fashion. Good hemostasis was assured. The subcuticular layers were reapproximated with 2-0 vicryl in interrupted fashion. The skin was closed with a 4-0 monocrylin a subcuticular fashion. The patient tolerated the procedure well. Sponge, lap, and needle counts were correct times two and the patient was taken to recovery in stable condition.       Estimated Blood Loss:  600 / QBL pending  UOP: IVF: see anesthesia report  Drains/implants: none  Complication: none    Specimens:   ID Type Source Tests Collected by Time Destination   1 :  Placenta   Jenna Quintanilla MD 2021 0732 Discarded        Cord Blood Results:  Information for the patient's :  Anestefano Lemus [[id number]]   No results found for: PCTABR, PCTDIG, BILI, ABORH     No results found for: APH, APCO2, APO2, AHCO3, ABEC, ABDC, O2ST, SITE, RSCOM     Prenatal Labs:  Lab Results   Component Value Date/Time    HBsAg, External negative 2021 12:00 AM    HIV, External negative 2021 12:00 AM    Rubella, External immune 2021 12:00 AM    Gonorrhea, External negative 2021 12:00 AM    Chlamydia, External negative 2021 12:00 AM    GrBStrep, External negative 2021 12:00 AM

## 2021-12-29 NOTE — PROGRESS NOTES
TRANSFER - IN REPORT:    Verbal report received from Kaylene Rodriguez RN(name) on [employer]  being received from labor and delivery(unit) for routine progression of care      Report consisted of patients Situation, Background, Assessment and   Recommendations(SBAR). Information from the following report(s) SBAR, OR Summary, Procedure Summary, Intake/Output, MAR and Recent Results was reviewed with the receiving nurse. Opportunity for questions and clarification was provided. Assessment completed upon patients arrival to unit and care assumed.

## 2021-12-29 NOTE — LACTATION NOTE
This note was copied from a baby's chart. Initial Lactation Consultation - Baby born by  today to a  mom at 36 6/7 weeks gestation. Mom noticed breast changes during her pregnancy and has no medical history negatively affecting milk production. I did not see the baby at the breast. Mom states the baby has been latching and nursing well and she is hearing some swallows. Feeding Plan: Mother will keep baby skin to skin as often as possible, feed on demand, respond to feeding cues, obtain latch, listen for audible swallowing, be aware of signs of oxytocin release/ cramping, thirst and sleepiness while breastfeeding. Mom will not limit the time the baby is at the breast. She will allow the baby to completely finish one breast and then offer the second breast at each feeding.

## 2021-12-29 NOTE — PROGRESS NOTES
In to see the patient - has requested CS. Per midwife pt has pushed for 3 hrs and has arrest of descent. Consented couple to proceed with CS and reviewed procedure and risks. Ancef/zithromax given.

## 2021-12-29 NOTE — ANESTHESIA POSTPROCEDURE EVALUATION
Post-Anesthesia Evaluation and Assessment    Patient: Julio Guthrie MRN: [id number]  SSN: [id number]    YOB: 1995  Age: 27 y.o. Sex: female      I have evaluated the patient and they are stable and ready for discharge from the PACU. Cardiovascular Function/Vital Signs  Visit Vitals  /85   Pulse 91   Temp 36.9 °C (98.4 °F)   Resp 16   Ht 5' 6\" (1.676 m)   Wt 68 kg (150 lb)   SpO2 94%   Breastfeeding Unknown   BMI 24.21 kg/m²       Patient is status post Epidural anesthesia for Procedure(s):   SECTION. Nausea/Vomiting: None    Postoperative hydration reviewed and adequate. Pain:  Pain Scale 1: Labor Algorithm/Pain Intensity (21)   Managed    Neurological Status:   Neuro (WDL): Within Defined Limits (21)   At baseline    Mental Status, Level of Consciousness: Alert and  oriented to person, place, and time    Pulmonary Status:   O2 Device: None (21)   Adequate oxygenation and airway patent    Complications related to anesthesia: None    Post-anesthesia assessment completed.  No concerns    Signed By: Kevin Rosas DO     2021

## 2021-12-29 NOTE — PROGRESS NOTES
Labor Progress Note    CNM assumed pt care. Laying in bed, breathing through contractions, considering options for rest, feeling exhausted. Didn't sleep well the last 2 nights. Patient seen, fetal heart rate and contraction pattern evaluated. Physical Exam:  Cervical Exam: not examined  Membranes: SROM x 20 hours, clear  Uterine Activity: q 2-3  Fetal Heart Rate: 125, moderate variability, accels present, no decels    Assessment/Plan:  Reassuring fetal status.    Pt elects stadol/phenergan rest now, considering epidural.         Taylor Welch CNM

## 2021-12-29 NOTE — ANESTHESIA PROCEDURE NOTES
Epidural Block    Patient location during procedure: OB  Start time: 12/28/2021 10:54 PM  End time: 12/28/2021 11:05 PM  Reason for block: labor epidural  Staffing  Performed: attending   Anesthesiologist: Dhiraj Acosta MD  Preanesthetic Checklist  Completed: patient identified, IV checked, site marked, risks and benefits discussed, surgical consent, monitors and equipment checked, pre-op evaluation and timeout performed  Block Placement  Patient position: left lateral decubitus  Prep: ChloraPrep  Sterility prep: cap, drape, gloves and mask  Sedation level: no sedation  Patient monitoring: continuous pulse oximetry and heart rate  Approach: midline  Location: lumbar  Lumbar location: L4-L5  Epidural  Loss of resistance technique: saline  Guidance: landmark technique  Needle  Needle type: Tuohy   Needle gauge: 17 G  Needle length: 9 cm  Needle insertion depth: 7 cm  Catheter type: end hole  Catheter size: 19 G  Catheter at skin depth: 12 cm  Catheter securement method: clear occlusive dressing, liquid medical adhesive and surgical tape  Medications Administered  Bupivacaine (PF) (MARCAINE) 0.5 % (5 mg/mL) Epidural, 30 mg  Assessment  Sensory level: T6  Block outcome: pain improved  Number of attempts: 1  Procedure assessment: patient tolerated procedure well with no immediate complications

## 2021-12-29 NOTE — PROGRESS NOTES
Labor Progress Note    Pt has been pushing x 3 hours. Baby is OP. Good maternal effort, but arrest of descent at the pubic arch. Patient seen, fetal heart rate and contraction pattern evaluated. Physical Exam:  Cervical Exam: not examined  Membranes: SROM x 30 hours, clear  Uterine Activity: q 2-4  Fetal Heart Rate: 135, moderate variability, accels present, no decels    Assessment/Plan:  Reassuring fetal status. Pt initially requested to continue pushing, but then asked for a PCS. Discussed with Dr Matthew Orozco, we will prep the pt and get ready for the OR.      Antoine Calvert CNM

## 2021-12-29 NOTE — PROGRESS NOTES
1935: SBAR report received from D Seaver RN. Pt ambulating in hallways with partner. 2000: RN at bs, pt is standing over birthing ball, rocking during ctx. 2025: ASHANTI Espitia CNM at bs to discuss POC. Pt c/o exhaustion d/t lack of adequate sleep. Options for further POC offered by CNM, all of pt's questions answered regarding options. Pt desires IV pain medication at this time in hopes of getting rest, may desire an epidural at a later time. 2240: Pt called out, c/o increased pain with ctx, pt desires epidural at this time. IV fluid bolus started. 2250: MD Dinora Wynn at bs for epidural placement. 2302: Epidural recovery started. Pt positioned in left lateral position. BP cycled. 2324: Repositioned to right lateral with peanut ball in flying cowgirl  0020: Straight cath 700mL  0027: Repositioned left lateral with peanut ball in flying cowgirl  0107: Repositioned to right lateral with peanut ball  0111: Repositioned to left lateral   0115: Repositioned to right lateral. Alec Mejia RN at bs.   5683: Trendelenburg in right lateral  0118: Hands and knees. Uriel Hdez CNM at bs.   0131: Repositioned to right lateral. O2 discontinued. 0216: start pushing. RN at bedside, continuously monitoring FHR tracing   0233: Left side pushing  0243: lithotomy pushing. RN at bedside, continuously monitoring FHR tracing   0245: straight cath 200mL  0250: High lithotomy pushing  0308: Squat bar tug of war  0322: lithotomy pushing. RN at bedside, continuously monitoring FHR tracing  0342: Right side lying pushing  0347: break from pushing. Reposition right lateral closed knee peanut ball. 8456: resume pushing at pt request  0419: Side lying release pushing L side x3 ctx  0430: Side lying release pushing R side x3 ctx  0443: Right side pushing closed knee with peanut ball  0500: lithotomy pushing  0512: straight cath 100mL  0520: SHUN Espitia at bs to evaluate progress. 56: Nupur HOFFMANN discussing options for POC. Fetal position ROP.  Pt can keep pushing or proceed with c/s. Pt given opportunity to discuss with partner. 0600: Resume pushing  8469: Pt requesting   0615: SHUN Espitia at  discussing c/s with pt. MD Brent Howard notified. Anesthesia notified.    6761: MD Lovett at  talking to pt    0700: From room 5 to LD OR1

## 2021-12-29 NOTE — PROGRESS NOTES
Delayed Entry note, Pt examined at 1800 [facility] Note  Kadie Collier is a 27 y.o.  with IUP at 40w5d, admitted following SROM overnight. Her pregnancy has been managed by Dr. Carrie Hernández and has been uncomplicated. Patient seen, fetal heart rate and contraction pattern evaluated, patient examined. Very uncomfortable with contractions since pitocin was started. Tolerating labor well though. Patient Vitals for the past 2 hrs:   BP Temp Pulse Resp   21 110/70 98.1 °F (36.7 °C) 96 16       Physical Exam:  Cervical Exam:  /-3  Uterine Activity: Ctx q3-5 min  Fetal Heart Rate: baseline 130, moderate variability, -accelerations, -decelerations. Cat 1.     Assessment/Plan:  Continue to titrate pitocin as tolerated  Pain management per patient request  Will sign out to Dr. Naomi Munoz overnight, she would be a good patient to be managed by CNM overnight    Babar Mazariegos MD

## 2021-12-30 LAB
ALBUMIN SERPL-MCNC: 2.4 G/DL (ref 3.5–5)
ALBUMIN/GLOB SERPL: 0.8 {RATIO} (ref 1.1–2.2)
ALP SERPL-CCNC: 162 U/L (ref 45–117)
ALT SERPL-CCNC: 22 U/L (ref 12–78)
ANION GAP SERPL CALC-SCNC: 4 MMOL/L (ref 5–15)
AST SERPL-CCNC: 34 U/L (ref 15–37)
BASOPHILS # BLD: 0 K/UL (ref 0–0.1)
BASOPHILS NFR BLD: 0 % (ref 0–1)
BILIRUB SERPL-MCNC: 0.3 MG/DL (ref 0.2–1)
BUN SERPL-MCNC: 7 MG/DL (ref 6–20)
BUN/CREAT SERPL: 14 (ref 12–20)
CALCIUM SERPL-MCNC: 8.4 MG/DL (ref 8.5–10.1)
CHLORIDE SERPL-SCNC: 109 MMOL/L (ref 97–108)
CO2 SERPL-SCNC: 24 MMOL/L (ref 21–32)
CREAT SERPL-MCNC: 0.51 MG/DL (ref 0.55–1.02)
DIFFERENTIAL METHOD BLD: ABNORMAL
EOSINOPHIL # BLD: 0.1 K/UL (ref 0–0.4)
EOSINOPHIL NFR BLD: 1 % (ref 0–7)
ERYTHROCYTE [DISTWIDTH] IN BLOOD BY AUTOMATED COUNT: 13.6 % (ref 11.5–14.5)
GLOBULIN SER CALC-MCNC: 3.1 G/DL (ref 2–4)
GLUCOSE SERPL-MCNC: 90 MG/DL (ref 65–100)
HCT VFR BLD AUTO: 36.1 % (ref 35–47)
HGB BLD-MCNC: 11.8 G/DL (ref 11.5–16)
IMM GRANULOCYTES # BLD AUTO: 0.1 K/UL (ref 0–0.04)
IMM GRANULOCYTES NFR BLD AUTO: 1 % (ref 0–0.5)
LYMPHOCYTES # BLD: 1.3 K/UL (ref 0.8–3.5)
LYMPHOCYTES NFR BLD: 10 % (ref 12–49)
MCH RBC QN AUTO: 32.2 PG (ref 26–34)
MCHC RBC AUTO-ENTMCNC: 32.7 G/DL (ref 30–36.5)
MCV RBC AUTO: 98.4 FL (ref 80–99)
MONOCYTES # BLD: 0.9 K/UL (ref 0–1)
MONOCYTES NFR BLD: 7 % (ref 5–13)
NEUTS SEG # BLD: 10.8 K/UL (ref 1.8–8)
NEUTS SEG NFR BLD: 81 % (ref 32–75)
NRBC # BLD: 0 K/UL (ref 0–0.01)
NRBC BLD-RTO: 0 PER 100 WBC
PLATELET # BLD AUTO: 150 K/UL (ref 150–400)
PMV BLD AUTO: 11 FL (ref 8.9–12.9)
POTASSIUM SERPL-SCNC: 4 MMOL/L (ref 3.5–5.1)
PROT SERPL-MCNC: 5.5 G/DL (ref 6.4–8.2)
RBC # BLD AUTO: 3.67 M/UL (ref 3.8–5.2)
SODIUM SERPL-SCNC: 137 MMOL/L (ref 136–145)
WBC # BLD AUTO: 13.2 K/UL (ref 3.6–11)

## 2021-12-30 PROCEDURE — 80053 COMPREHEN METABOLIC PANEL: CPT

## 2021-12-30 PROCEDURE — 36415 COLL VENOUS BLD VENIPUNCTURE: CPT

## 2021-12-30 PROCEDURE — 74011250636 HC RX REV CODE- 250/636: Performed by: ANESTHESIOLOGY

## 2021-12-30 PROCEDURE — 74011250637 HC RX REV CODE- 250/637: Performed by: OBSTETRICS & GYNECOLOGY

## 2021-12-30 PROCEDURE — 85025 COMPLETE CBC W/AUTO DIFF WBC: CPT

## 2021-12-30 PROCEDURE — 65410000002 HC RM PRIVATE OB

## 2021-12-30 RX ADMIN — KETOROLAC TROMETHAMINE 30 MG: 30 INJECTION, SOLUTION INTRAMUSCULAR; INTRAVENOUS at 01:04

## 2021-12-30 RX ADMIN — IBUPROFEN 800 MG: 400 TABLET, FILM COATED ORAL at 13:08

## 2021-12-30 RX ADMIN — KETOROLAC TROMETHAMINE 30 MG: 30 INJECTION, SOLUTION INTRAMUSCULAR; INTRAVENOUS at 07:06

## 2021-12-30 RX ADMIN — IBUPROFEN 800 MG: 400 TABLET, FILM COATED ORAL at 22:10

## 2021-12-30 NOTE — PROGRESS NOTES
Post-Operative Day Number 1 Progress Note    Patient doing well post-op day 1 from  delivery without significant complaints. Pain controlled on current medication. Voiding without difficulty, normal lochia. Vitals:  Patient Vitals for the past 8 hrs:   BP Temp Pulse Resp SpO2   21 0712 113/76 98.2 °F (36.8 °C) 83 16 98 %   21 0342 106/71 97.7 °F (36.5 °C) 80 16 96 %   21 0021 100/65 97.9 °F (36.6 °C) 89 16 96 %     Temp (24hrs), Av.4 °F (36.9 °C), Min:97.7 °F (36.5 °C), Max:99.2 °F (37.3 °C)      Vital signs stable, afebrile. Exam:  Patient without distress. Abdomen soft, fundus firm at level of umbilicus, non tender. Dressing dry               Lower extremities are negative for swelling, cords or tenderness. Lab/Data Review:  Recent Results (from the past 12 hour(s))   METABOLIC PANEL, COMPREHENSIVE    Collection Time: 21  3:58 AM   Result Value Ref Range    Sodium 137 136 - 145 mmol/L    Potassium 4.0 3.5 - 5.1 mmol/L    Chloride 109 (H) 97 - 108 mmol/L    CO2 24 21 - 32 mmol/L    Anion gap 4 (L) 5 - 15 mmol/L    Glucose 90 65 - 100 mg/dL    BUN 7 6 - 20 MG/DL    Creatinine 0.51 (L) 0.55 - 1.02 MG/DL    BUN/Creatinine ratio 14 12 - 20      GFR est AA >60 >60 ml/min/1.73m2    GFR est non-AA >60 >60 ml/min/1.73m2    Calcium 8.4 (L) 8.5 - 10.1 MG/DL    Bilirubin, total 0.3 0.2 - 1.0 MG/DL    ALT (SGPT) 22 12 - 78 U/L    AST (SGOT) 34 15 - 37 U/L    Alk.  phosphatase 162 (H) 45 - 117 U/L    Protein, total 5.5 (L) 6.4 - 8.2 g/dL    Albumin 2.4 (L) 3.5 - 5.0 g/dL    Globulin 3.1 2.0 - 4.0 g/dL    A-G Ratio 0.8 (L) 1.1 - 2.2     CBC WITH AUTOMATED DIFF    Collection Time: 21  3:58 AM   Result Value Ref Range    WBC 13.2 (H) 3.6 - 11.0 K/uL    RBC 3.67 (L) 3.80 - 5.20 M/uL    HGB 11.8 11.5 - 16.0 g/dL    HCT 36.1 35.0 - 47.0 %    MCV 98.4 80.0 - 99.0 FL    MCH 32.2 26.0 - 34.0 PG    MCHC 32.7 30.0 - 36.5 g/dL    RDW 13.6 11.5 - 14.5 %    PLATELET 519 710 - 279 K/uL    MPV 11.0 8.9 - 12.9 FL    NRBC 0.0 0  WBC    ABSOLUTE NRBC 0.00 0.00 - 0.01 K/uL    NEUTROPHILS 81 (H) 32 - 75 %    LYMPHOCYTES 10 (L) 12 - 49 %    MONOCYTES 7 5 - 13 %    EOSINOPHILS 1 0 - 7 %    BASOPHILS 0 0 - 1 %    IMMATURE GRANULOCYTES 1 (H) 0.0 - 0.5 %    ABS. NEUTROPHILS 10.8 (H) 1.8 - 8.0 K/UL    ABS. LYMPHOCYTES 1.3 0.8 - 3.5 K/UL    ABS. MONOCYTES 0.9 0.0 - 1.0 K/UL    ABS. EOSINOPHILS 0.1 0.0 - 0.4 K/UL    ABS. BASOPHILS 0.0 0.0 - 0.1 K/UL    ABS. IMM. GRANS. 0.1 (H) 0.00 - 0.04 K/UL    DF AUTOMATED       Lab Results   Component Value Date/Time    Rubella, External immune 2021 12:00 AM    GrBStrep, External negative 2021 12:00 AM    HBsAg, External negative 2021 12:00 AM    HIV, External negative 2021 12:00 AM    T. Pallidum Antibody, External negative 2021 12:00 AM    Gonorrhea, External negative 2021 12:00 AM    Chlamydia, External negative 2021 12:00 AM         Assessment and Plan:  Patient appears to be having uncomplicated post- course. Continue routine post-op care and maternal education. Boy-wants circ. Counseled couple and lidocaine ordered.

## 2021-12-30 NOTE — PROGRESS NOTES
2021  7:49 AM    Patient status post Procedure(s):   SECTION on 2021 under epidural with duramorph. Visit Vitals  /76 (BP 1 Location: Left upper arm, BP Patient Position: At rest)   Pulse 83   Temp 36.8 °C (98.2 °F)   Resp 16   Ht 5' 6\" (1.676 m)   Wt 68 kg (150 lb)   SpO2 98%   Breastfeeding Unknown   BMI 24.21 kg/m²     Patient doing relatively well.     mild itching. Pain is mild . none nausea and/or vomiting. Urinary: voiding well  PO intake: good  Block site reveals normal exam; no erythema, swelling or tenderness. No complications. Continue current postop pain regimen.         Abbie Bills MD    [facility]

## 2021-12-30 NOTE — PROGRESS NOTES
Bedside and Verbal shift change report given to AMIRA Ingram RN (oncoming nurse) by Juan Rubin. Domenica Olsen RN (offgoing nurse). Report included the following information SBAR.      Rajan Boston RN

## 2021-12-31 VITALS
WEIGHT: 150 LBS | OXYGEN SATURATION: 97 % | SYSTOLIC BLOOD PRESSURE: 127 MMHG | RESPIRATION RATE: 16 BRPM | DIASTOLIC BLOOD PRESSURE: 85 MMHG | TEMPERATURE: 98.2 F | HEART RATE: 89 BPM | BODY MASS INDEX: 24.11 KG/M2 | HEIGHT: 66 IN

## 2021-12-31 PROCEDURE — 74011250637 HC RX REV CODE- 250/637: Performed by: OBSTETRICS & GYNECOLOGY

## 2021-12-31 RX ORDER — OXYCODONE AND ACETAMINOPHEN 5; 325 MG/1; MG/1
1 TABLET ORAL
Qty: 20 TABLET | Refills: 0 | Status: SHIPPED | OUTPATIENT
Start: 2021-12-31 | End: 2022-01-05

## 2021-12-31 RX ORDER — IBUPROFEN 800 MG/1
800 TABLET ORAL
Qty: 30 TABLET | Refills: 1 | Status: SHIPPED | OUTPATIENT
Start: 2021-12-31

## 2021-12-31 RX ADMIN — DOCUSATE SODIUM 100 MG: 100 CAPSULE ORAL at 09:59

## 2021-12-31 RX ADMIN — OXYCODONE HYDROCHLORIDE AND ACETAMINOPHEN 1 TABLET: 5; 325 TABLET ORAL at 09:59

## 2021-12-31 RX ADMIN — IBUPROFEN 800 MG: 400 TABLET, FILM COATED ORAL at 06:50

## 2021-12-31 NOTE — ROUTINE PROCESS
0800: Bedside SBAR received from [facility], RN.    4346 : I have reviewed discharge instructions with the patient. The patient verbalized understanding.

## 2021-12-31 NOTE — LACTATION NOTE
This note was copied from a baby's chart. Baby nursing well and has improved throughout post partum stay, deep latch maintained, mother is comfortable, milk is in transition, baby feeding vigorously with rhythmic suck, swallow, breathe pattern, with audible swallowing, and evident milk transfer, both breasts offered, baby is asleep following feeding. Baby is feeding on demand, voiding and stools present as appropriate since birth. weight loss:  7.4%  Assisted mom with positioning infant in the football position; deep latch noted with swallows heard. Breasts may become engorged when milk \"comes in\". How milk is made / normal phases of milk production, supply and demand discussed. Taught care of engorged breasts - frequent breastfeeding encouraged and breast massage ac. Then nurse the baby (or pump minimally for comfort). Apply cold compresses ac and/or pc x 15 minutes a few times a day for swelling or discomfort. May need to do this care for a couple of days. Discussed prevention and treatment of mastitis.

## 2021-12-31 NOTE — DISCHARGE INSTRUCTIONS
Postpartum Support Groups (virtual)  We know that all of us are dealing with a tremendous amount of uncertainty, confusion and disruption to our daily lives, which may result in increased anxiety, depression and fear. If you are feeling unsettled or worse, please know that we are here to help. During this time of increased caution and care for one another, [facility] [place] ([address] [place] [address]) is offering virtual support groups to ALL MOTHERS in [place] regardless of the age of your child/children as a way to help weather this emotional storm together. Social support is an important part of self-care during this time of physical distancing. Virtual postpartum support group meetings available at www. postpartumva.org  Warm Line: [phone]    Breastfeeding Support Groups (virtual)  1st and 3rd Wednesday of each month  2nd and 4th Tuesday of each month    [place] at [email or web address] under the \"About Us\" and \"Classes and Events tabs\"

## 2021-12-31 NOTE — PROGRESS NOTES
Post-Operative  Day 3    Ericka American       Pt reports pain is worsening daily. Able to ambulate but has significant pain. Taking only motrin for pain. Voiding spontaneously, ambulating. Breastfeeding. Is looking forward to going home today. Vitals:  Visit Vitals  /70 (BP 1 Location: Left arm, BP Patient Position: At rest)   Pulse 82   Temp 98 °F (36.7 °C)   Resp 16   Ht 5' 6\" (1.676 m)   Wt 68 kg (150 lb)   SpO2 97%   Breastfeeding Unknown   BMI 24.21 kg/m²     Temp (24hrs), Av.1 °F (36.7 °C), Min:97.9 °F (36.6 °C), Max:98.3 °F (36.8 °C)        Exam:      Patient without distress. Abdomen: soft, expected tenderness, fundus firm                Bandage c/d/i               Lower extremities are nontender without edema. No cords    Labs:   Lab Results   Component Value Date/Time    WBC 13.2 (H) 2021 03:58 AM    WBC 9.7 2021 07:45 AM    HGB 11.8 2021 03:58 AM    HGB 14.0 2021 07:45 AM    HCT 36.1 2021 03:58 AM    HCT 41.3 2021 07:45 AM    PLATELET 862  03:58 AM    PLATELET 527  07:45 AM       No results found for this or any previous visit (from the past 24 hour(s)). Assessment: POD 3 s/p PLTCS for arrest of descent. Rh+/RI/male infant s/p circumcision    Plan:   1. EPDS 14, pt reports that because the survey asked about the past 7 days, personal stressors affected her responses. Both their parents have recently tested positive for covid, for example. Denies symptoms of depression related to delivery and having a new baby. Discussed that she is at high risk for PPD and encouraged to call if she experiences symptoms. 2. Pain, inadequately controlled, encouraged use of percocet which she has not been taking  3.  Discharge home today, RTO 1-2w for incision check    Signed By: Parrish Page DO     2021

## 2021-12-31 NOTE — ROUTINE PROCESS
2000: Bedside shift change report given to MELL Domingo RN (oncoming nurse) by Steph Del Valle RN (offgoing nurse). Report included the following information SBAR.

## 2021-12-31 NOTE — DISCHARGE SUMMARY
Obstetrical Discharge Summary     Name: Cha Medina MRN: [id number]  SSN: [id number]    YOB: 1995  Age: 27 y.o. Sex: female      Allergies: Cashew nut    Admit Date: 2021    Discharge Date: 2021     Admitting Physician: Saintclair Milling, MD     Attending Physician:  Alia Nunes MD     * Admission Diagnoses: Amniotic fluid leaking [O42.90]    * Discharge Diagnoses:   Information for the patient's :  Kevon Tracy [[id number]]   Delivery of a 3.475 kg male infant via , Low Transverse on 2021 at 7:25 AM  by Arley Llanes. Apgars were 9  and 9 . Additional Diagnoses:   Hospital Problems as of 2021 Never Reviewed          Codes Class Noted - Resolved POA    Amniotic fluid leaking ICD-10-CM: O42.90  ICD-9-CM: 658.10  2021 - Present Unknown             Lab Results   Component Value Date/Time    Rubella, External immune 2021 12:00 AM    GrBStrep, External negative 2021 12:00 AM    ABO,Rh A positive 2021 12:00 AM      Immunization History   Administered Date(s) Administered    COVID-19, Moderna, Primary or Immunocompromised Series, MRNA, PF, 100mcg/0.5mL 2021, 2021, 2021    DTaP 10/18/2021    Influenza Vaccine 10/18/2021       * Procedures: Epidural, PLTCS  Procedure(s):   SECTION           * Discharge Condition: good    * Hospital Course: Normal hospital course following the delivery. * Disposition: Home    Discharge Medications:   Current Discharge Medication List      START taking these medications    Details   ibuprofen (MOTRIN) 800 mg tablet Take 1 Tablet by mouth every eight (8) hours as needed for Pain. Qty: 30 Tablet, Refills: 1  Start date: 2021      oxyCODONE-acetaminophen (PERCOCET) 5-325 mg per tablet Take 1 Tablet by mouth every six (6) hours as needed (severe pain) for up to 5 days. Max Daily Amount: 4 Tablets.   Qty: 20 Tablet, Refills: 0  Start date: 2021, End date: 2022    Associated Diagnoses: Status post  delivery         CONTINUE these medications which have NOT CHANGED    Details   prenatal vit no.124/iron/folic (PRENATAL VITAMIN PO) Take 1 Tablet by mouth daily. Indications: pregnancy             * Follow-up Care/Patient Instructions: Activity: Activity as tolerated and No sex for 6 weeks  Diet: Regular Diet  Wound Care: Keep wound clean and dry    Follow-up Information     Follow up With Specialties Details Why Contact Info    Susana Saldivar MD Gynecology, Obstetrics In 1 week  [address]  [phone]                        .

## 2022-03-20 PROBLEM — O42.90 AMNIOTIC FLUID LEAKING: Status: ACTIVE | Noted: 2021-12-28

## 2023-05-15 RX ORDER — IBUPROFEN 800 MG/1
800 TABLET ORAL EVERY 8 HOURS PRN
COMMUNITY
Start: 2021-12-31

## (undated) DEVICE — DEVON™ KNEE AND BODY STRAP 60" X 3" (1.5 M X 7.6 CM): Brand: DEVON

## (undated) DEVICE — SOLUTION IRRIG 1000ML 0.9% SOD CHL USP POUR PLAS BTL

## (undated) DEVICE — (D)PREP SKN CHLRAPRP APPL 26ML -- CONVERT TO ITEM 371833

## (undated) DEVICE — STERILE POLYISOPRENE POWDER-FREE SURGICAL GLOVES WITH EMOLLIENT COATING: Brand: PROTEXIS

## (undated) DEVICE — POOLE SUCTION INSTRUMENT WITH REMOVABLE SHEATH: Brand: POOLE

## (undated) DEVICE — DRAPE FLD WRM W44XL66IN C6L FOR INTRATEMP SYS THERMABASIN

## (undated) DEVICE — ROYALSILK SURGICAL GOWN, L: Brand: CONVERTORS

## (undated) DEVICE — CATH FOLEY 16F LUBRI-SIL IC --

## (undated) DEVICE — ELECTROSURGICAL DEVICE HOLSTER;FOR USE WITH MAXIMUM PEAK VOLTAGE OF 4000 V: Brand: FORCE TRIVERSE

## (undated) DEVICE — PACK PROCEDURE SURG C SECT KT SMH

## (undated) DEVICE — REM POLYHESIVE ADULT PATIENT RETURN ELECTRODE: Brand: VALLEYLAB

## (undated) DEVICE — COVERALL PREM SMS 2XL KNIT --

## (undated) DEVICE — SUTURE VCRL SZ 3-0 L36IN ABSRB VLT CT L40MM 1/2 CIR J356H

## (undated) DEVICE — STERILE POLYISOPRENE POWDER-FREE SURGICAL GLOVES: Brand: PROTEXIS

## (undated) DEVICE — Device: Brand: PORTEX

## (undated) DEVICE — LIGHT HANDLE: Brand: DEVON

## (undated) DEVICE — 3000CC GUARDIAN II: Brand: GUARDIAN

## (undated) DEVICE — SOLUTION IRRIG 1000ML H2O STRL BLT

## (undated) DEVICE — SOLUTION IRRIG 1000ML STRL H2O USP PLAS POUR BTL

## (undated) DEVICE — DRESSING AQUACEL ADVANTAGE ALG W4XL5IN RECT GREATER ABSRB HYDRFBR TECHNOLOGY

## (undated) DEVICE — SUTURE VCRL SZ 0 L36IN ABSRB VLT L40MM CT 1/2 CIR J358H

## (undated) DEVICE — KENDALL SCD EXPRESS SLEEVES, KNEE LENGTH, MEDIUM: Brand: KENDALL SCD

## (undated) DEVICE — SOLUTION IV 1000ML 0.9% SOD CHL

## (undated) DEVICE — SUTURE MCRYL SZ 4-0 L27IN ABSRB UD L24MM PS-1 3/8 CIR PRIM Y935H

## (undated) DEVICE — SOLIDIFIER MEDC 1200ML -- CONVERT TO 356117

## (undated) DEVICE — SUTURE MCRYL SZ 1 L36IN ABSRB VLT CT-1 L36MM 1/2 CIR TAPR Y347H